# Patient Record
Sex: FEMALE | Race: WHITE | NOT HISPANIC OR LATINO | URBAN - METROPOLITAN AREA
[De-identification: names, ages, dates, MRNs, and addresses within clinical notes are randomized per-mention and may not be internally consistent; named-entity substitution may affect disease eponyms.]

---

## 2017-08-03 ENCOUNTER — IMPORTED ENCOUNTER (OUTPATIENT)
Dept: URBAN - METROPOLITAN AREA CLINIC 9 | Facility: CLINIC | Age: 72
End: 2017-08-03

## 2018-08-01 ENCOUNTER — IMPORTED ENCOUNTER (OUTPATIENT)
Dept: URBAN - METROPOLITAN AREA CLINIC 9 | Facility: CLINIC | Age: 73
End: 2018-08-01

## 2019-07-12 NOTE — PATIENT DISCUSSION
Stopped Today: latanoprost (PF) (latanoprost (pf)): drops: 0.005% 1 drop at bedtime as directed into both eyes

## 2019-07-12 NOTE — PATIENT DISCUSSION
POAG, OU: INTRAOCULAR PRESSURE IS WITHIN ACCEPTABLE LIMITS. PT INSTRUCTED TO CONTINUE LATANOPROST OU AND RETURN FOR FOLLOW-UP AS SCHEDULED.

## 2019-08-05 ENCOUNTER — IMPORTED ENCOUNTER (OUTPATIENT)
Dept: URBAN - METROPOLITAN AREA CLINIC 9 | Facility: CLINIC | Age: 74
End: 2019-08-05

## 2020-08-13 ENCOUNTER — IMPORTED ENCOUNTER (OUTPATIENT)
Dept: URBAN - METROPOLITAN AREA CLINIC 9 | Facility: CLINIC | Age: 75
End: 2020-08-13

## 2021-08-12 ENCOUNTER — IMPORTED ENCOUNTER (OUTPATIENT)
Dept: URBAN - METROPOLITAN AREA CLINIC 9 | Facility: CLINIC | Age: 76
End: 2021-08-12

## 2021-08-12 PROBLEM — D31.32: Noted: 2021-08-12

## 2021-08-12 PROBLEM — H43.392: Noted: 2021-08-12

## 2021-08-12 PROBLEM — H33.011: Noted: 2021-08-12

## 2021-10-17 ASSESSMENT — VISUAL ACUITY
OD_CC: 20/20 -2 SN
OD_CC: 20/30 + SN
OD_CC: 20/25 +2 SN
OS_SC: 20/30 - SN
OS_SC: 20/70 - SN
OD_SC: 20/25 -2 SN
OS_CC: 20/20 - SN
OS_CC: 20/20 -2 SN
OS_CC: 20/30 -2 SN
OD_SC: 20/60 + SN

## 2021-10-17 ASSESSMENT — TONOMETRY
OD_IOP_MMHG: 14
OD_IOP_MMHG: 19
OD_IOP_MMHG: 16
OS_IOP_MMHG: 13
OS_IOP_MMHG: 17
OS_IOP_MMHG: 15
OD_IOP_MMHG: 21
OD_IOP_MMHG: 17
OS_IOP_MMHG: 20
OS_IOP_MMHG: 21

## 2022-08-25 ENCOUNTER — ESTABLISHED PATIENT (OUTPATIENT)
Dept: URBAN - METROPOLITAN AREA CLINIC 17 | Facility: CLINIC | Age: 77
End: 2022-08-25

## 2022-08-25 DIAGNOSIS — H33.011: ICD-10-CM

## 2022-08-25 DIAGNOSIS — D31.32: ICD-10-CM

## 2022-08-25 PROCEDURE — 92014 COMPRE OPH EXAM EST PT 1/>: CPT

## 2022-08-25 PROCEDURE — 92134 CPTRZ OPH DX IMG PST SGM RTA: CPT

## 2022-08-25 ASSESSMENT — TONOMETRY
OS_IOP_MMHG: 19
OD_IOP_MMHG: 19

## 2022-08-25 ASSESSMENT — VISUAL ACUITY
OU_CC: 20/25
OS_CC: 20/40
OD_CC: 20/25-2

## 2023-11-02 ENCOUNTER — ESTABLISHED PATIENT (OUTPATIENT)
Dept: URBAN - METROPOLITAN AREA CLINIC 17 | Facility: CLINIC | Age: 78
End: 2023-11-02

## 2023-11-02 DIAGNOSIS — H33.011: ICD-10-CM

## 2023-11-02 DIAGNOSIS — D31.32: ICD-10-CM

## 2023-11-02 PROCEDURE — 92134 CPTRZ OPH DX IMG PST SGM RTA: CPT

## 2023-11-02 PROCEDURE — 92014 COMPRE OPH EXAM EST PT 1/>: CPT

## 2023-11-02 ASSESSMENT — VISUAL ACUITY
OD_CC: 20/25-2
OS_CC: 20/25-2

## 2023-11-02 ASSESSMENT — TONOMETRY
OS_IOP_MMHG: 19
OD_IOP_MMHG: 18

## 2024-11-20 ENCOUNTER — COMPREHENSIVE EXAM (OUTPATIENT)
Dept: URBAN - METROPOLITAN AREA CLINIC 18 | Facility: CLINIC | Age: 79
End: 2024-11-20

## 2024-11-20 DIAGNOSIS — H35.373: ICD-10-CM

## 2024-11-20 DIAGNOSIS — H35.342: ICD-10-CM

## 2024-11-20 DIAGNOSIS — H43.812: ICD-10-CM

## 2024-11-20 PROCEDURE — 92014 COMPRE OPH EXAM EST PT 1/>: CPT

## 2024-11-20 PROCEDURE — 92134 CPTRZ OPH DX IMG PST SGM RTA: CPT

## 2024-11-20 PROCEDURE — 92201 OPSCPY EXTND RTA DRAW UNI/BI: CPT
